# Patient Record
Sex: MALE | Race: WHITE
[De-identification: names, ages, dates, MRNs, and addresses within clinical notes are randomized per-mention and may not be internally consistent; named-entity substitution may affect disease eponyms.]

---

## 2019-04-11 ENCOUNTER — HOSPITAL ENCOUNTER (EMERGENCY)
Dept: HOSPITAL 25 - UCCORT | Age: 79
Discharge: HOME | End: 2019-04-11
Payer: MEDICARE

## 2019-04-11 VITALS — DIASTOLIC BLOOD PRESSURE: 66 MMHG | SYSTOLIC BLOOD PRESSURE: 112 MMHG

## 2019-04-11 DIAGNOSIS — Z88.2: ICD-10-CM

## 2019-04-11 DIAGNOSIS — F02.80: ICD-10-CM

## 2019-04-11 DIAGNOSIS — M51.34: ICD-10-CM
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
## 2019-04-11 DIAGNOSIS — G20: ICD-10-CM

## 2019-04-11 DIAGNOSIS — M47.814: ICD-10-CM

## 2019-04-11 DIAGNOSIS — Z88.6: ICD-10-CM

## 2019-04-11 DIAGNOSIS — Z88.0: ICD-10-CM

## 2019-04-11 DIAGNOSIS — M47.816: ICD-10-CM

## 2019-04-11 DIAGNOSIS — S30.0XXA: Primary | ICD-10-CM

## 2019-04-11 DIAGNOSIS — M85.88: ICD-10-CM

## 2019-04-11 DIAGNOSIS — Z87.891: ICD-10-CM

## 2019-04-11 DIAGNOSIS — Z96.611: ICD-10-CM

## 2019-04-11 DIAGNOSIS — M51.36: ICD-10-CM

## 2019-04-11 DIAGNOSIS — Z88.8: ICD-10-CM
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
## 2019-04-11 DIAGNOSIS — Z96.643: ICD-10-CM

## 2019-04-11 DIAGNOSIS — S51.811A: ICD-10-CM

## 2019-04-11 PROCEDURE — 72100 X-RAY EXAM L-S SPINE 2/3 VWS: CPT

## 2019-04-11 PROCEDURE — 81003 URINALYSIS AUTO W/O SCOPE: CPT

## 2019-04-11 PROCEDURE — G0463 HOSPITAL OUTPT CLINIC VISIT: HCPCS

## 2019-04-11 PROCEDURE — 99212 OFFICE O/P EST SF 10 MIN: CPT

## 2019-04-11 PROCEDURE — 72070 X-RAY EXAM THORAC SPINE 2VWS: CPT

## 2019-04-11 NOTE — UC
Back Pain HPI





- HPI Summary


HPI Summary: 





80yo man with Parkinson's disease and mild dementia who fell in the bathroom at 

7 am. Fall was unwitnessed, but his wife heard him fall and arrived shortly 

thereafter. Small bathroom and he and his wife believe that he tripped over a 

towel. Was alert when she arrived at the site, and she pulled him out of the 

bathroom. Called daughter and son-in-law for assistance, and needed help to get 

to his feet, but could ambulate. 


He has pain in the low thoracic and high lumbar spines, along with pain to the 

right flank. Voiding normally, no wyatt hematuria. 


He has 2 small laceration and ecchymoses on the right upper forearm. 


Came for assessment because 1.5 hyears ago he had a fracture after a fall which 

went un noticed for a couple of days. 


Started on donepezil just 2 days ago by Dr. Landry, denies any side effects at 

this time. 





- History of Current Complaint


Chief Complaint: UCBackPain


Stated Complaint: S/P FALL, BACK PAIN


Time Seen by Provider: 04/11/19 14:43


Hx Obtained From: Patient, Family/Caretaker - here with his wife.


Onset/Duration: Sudden Onset, Lasting Hours


Timing: Constant


Severity Initially: Moderate


Severity Currently: Moderate


Pain Intensity: 7


Back Pain: Is Diffuse


Character: Aching


Aggravating Factor(s): Movement, Bending, Walking


Alleviating Factor(s): Rest, Cold - has been using ice packs regularly today


Associated Signs And Symptoms: Positive: Bruising - on right forearm





- Risk Factors


AAA Risk Factors: Negative


TAD Risk Factors: Negative


Cauda Equina Risk Factors: Negative


Epidural Abscess Risk Factors: Negative





- Allergies/Home Medications


Allergies/Adverse Reactions: 


 Allergies











Allergy/AdvReac Type Severity Reaction Status Date / Time


 


adalimumab [From Humira] Allergy  Rash Verified 04/11/19 14:35


 


aspirin Allergy  Unknown Verified 04/11/19 14:35





   Reaction  





   Details  


 


Carbonic Anhydrase Inhibitors Allergy  Unknown Verified 04/11/19 14:35





   Reaction  





   Details  


 


celecoxib Allergy  Unknown Verified 04/11/19 14:35





   Reaction  





   Details  


 


mercaptopurine Allergy  Decreased Verified 04/11/19 14:35





   RBC  





   Production  


 


NSAIDS (Non-Steroidal Allergy  Unknown Verified 04/11/19 14:35





Anti-Inflamma   Reaction  





   Details  


 


Penicillins Allergy  Rash Verified 04/11/19 14:35


 


saccharin Allergy  Unknown Verified 04/11/19 14:35





   Reaction  





   Details  


 


Sulfa (Sulfonamide Allergy  Hives Verified 04/11/19 14:35





Antibiotics)     


 


Thiazides Allergy  Unknown Verified 04/11/19 14:35





   Reaction  





   Details  


 


Cyclamate and Derivatives Allergy  Unknown Uncoded 04/11/19 14:35





   Reaction  





   Details  











Home Medications: 


 Home Medications





Ascorbic Acid TAB* [Vitamin C  TAB*] 1,000 mg PO DAILY 04/11/19 [History 

Confirmed 04/11/19]


B2/Vits A,C,E/Lut/Zeaxanth/Min [Icaps Tablet] 1 each PO DAILY 04/11/19 [History 

Confirmed 04/11/19]


Budesonide [Budesonide ER] 9 mg PO DAILY 04/11/19 [History Confirmed 04/11/19]


Carbidopa/Levodop 25/100 MG(*) [Sinemet 25/100 TAB(*)] 1 tab PO BEDTIME 04/11/ 19 [History Confirmed 04/11/19]


Carbidopa/Levodopa [Carbidopa-Levodopa  Tab] 1.5 each PO TID 04/11/19 [

History Confirmed 04/11/19]


Cyanocobalamin (Vitamin B-12) [Physicians Ez Use B-12 Co] 1,000 mcg IM Q14D 04/ 11/19 [History Confirmed 04/11/19]


Donepezil TAB* [Aricept 5 MG TAB*] 5 mg PO DAILY 04/11/19 [History Confirmed 04/ 11/19]


Folic Acid 5 mg PO DAILY 04/11/19 [History Confirmed 04/11/19]


Gabapentin 300 mg PO BEDTIME 04/11/19 [History Confirmed 04/11/19]


Mesalamine CAP(NF) [Pentasa(NF)] 1,000 mg PO QID 04/11/19 [History Confirmed 04/ 11/19]


Mirabegron [Myrbetriq] 50 mg PO DAILY 04/11/19 [History Confirmed 04/11/19]


PARoxetine HCl [Paxil] 10 mg PO DAILY 04/11/19 [History Confirmed 04/11/19]


Potassium Chloride 30 meq PO DAILY 04/11/19 [History Confirmed 04/11/19]


Solifenacin Succinate [Vesicare] 5 mg PO QPM 04/11/19 [History Confirmed 04/11/

19]











PMH/Surg Hx/FS Hx/Imm Hx


Previously Healthy: No


GI/ History: Other - Crohn's colitis


Neurological History: Dementia, Other - Parkinson's disease





- Surgical History


Surgical History: Yes


Surgery Procedure, Year, and Place: SEVERAL BOWEL RESECTIONS DUE TO CROHNS 

DISEASE; BOTH HIP REPLACEMENTS; Rt SHOULDER; HERNIA REPAIRS, fractured elbow





- Family History


Known Family History: Positive: Non-Contributory





- Social History


Occupation: Retired


Lives: With Family


Alcohol Use: None


Substance Use Type: None


Smoking Status (MU): Former Smoker


When Did the Patient Quit Smoking/Using Tobacco: 1995





Review of Systems


All Other Systems Reviewed And Are Negative: Yes


Eyes: Negative: Blurred Vision, Diplopia


ENT: Positive: Negative


Respiratory: Negative: Shortness Of Breath, Cough


Cardiovascular: Negative: Chest Pain


Gastrointestinal: Negative: Abdominal Pain


Genitourinary: Negative: Dysuria, Hematuria, Frequency


Motor: Positive: Decreased ROM - in lumbar spine


Musculoskeletal: Positive: Arthralgia, Myalgia


Psychological: Positive: Negative





Physical Exam


Triage Information Reviewed: Yes


Appearance: Ill-Appearing - looks chronically unwell with Parkinsonian facies. 

Alert, answers questions slowly, appropriately


Vital Signs: 


 Initial Vital Signs











Temp  97.9 F   04/11/19 13:59


 


Pulse  93   04/11/19 13:59


 


Resp  24   04/11/19 13:59


 


BP  112/66   04/11/19 13:59


 


Pulse Ox  97   04/11/19 13:59











Eyes: Positive: Conjunctiva Clear


ENT: Positive: Pharynx normal


Neck: Positive: Supple, Nontender, No Lymphadenopathy


Respiratory: Positive: Lungs clear, Normal breath sounds


Cardiovascular: Positive: RRR, No Murmur


Musculoskeletal: Positive: Strength Intact


Neurological Exam: Other - No pronator drift.


Neurological: Positive: Alert, Muscle Tone Normal


Skin Exam: Other - right forearm with diffuse area of ecchymosis inferior to 

elbow approx 10 x 8 cm area, zig zag laceration 2.5 cm in length. Steristrips 

used to approximate the margins.





Back Pain Course/Dx





- Course


Course Of Treatment: 





Continue ice and acetaminophen as needed for control of pain. 


Steristrips and dressing applied to right forearm laceration. 





- Differential Dx/Diagnosis


Differential Diagnosis/HQI/PQRI: Fracture - compression fracture, Strain, Sprain

, Other - contusion right lower back.


Provider Diagnosis: 


 Contusion of right side of back, Laceration of forearm, right








Discharge





- Sign-Out/Discharge


Documenting (check all that apply): Patient Departure


All imaging exams completed and their final reports reviewed: Yes





- Discharge Plan


Condition: Stable


Disposition: HOME


Patient Education Materials:  Contusion in Adults (ED), Steristrips (ED)


Referrals: 


Jenny Hartman MD [Primary Care Provider] - 


Additional Instructions: 


Continue ice to the area of impact on the back, and use acetaminophen as needed 

for control of pain. 


Follow up with Dr. Abreu if you have ongoing pain in the low back. 


Allow the steristrips to peel away as the laceration on the forearm heals. 





- Billing Disposition and Condition


Condition: STABLE


Disposition: Home